# Patient Record
Sex: MALE | Race: WHITE | NOT HISPANIC OR LATINO | URBAN - METROPOLITAN AREA
[De-identification: names, ages, dates, MRNs, and addresses within clinical notes are randomized per-mention and may not be internally consistent; named-entity substitution may affect disease eponyms.]

---

## 2021-07-15 ENCOUNTER — OUTPATIENT (OUTPATIENT)
Dept: OUTPATIENT SERVICES | Facility: HOSPITAL | Age: 36
LOS: 1 days | End: 2021-07-15
Payer: COMMERCIAL

## 2021-07-15 VITALS
WEIGHT: 179.9 LBS | DIASTOLIC BLOOD PRESSURE: 82 MMHG | OXYGEN SATURATION: 100 % | HEART RATE: 72 BPM | RESPIRATION RATE: 16 BRPM | TEMPERATURE: 97 F | SYSTOLIC BLOOD PRESSURE: 132 MMHG | HEIGHT: 71 IN

## 2021-07-15 VITALS
OXYGEN SATURATION: 99 % | RESPIRATION RATE: 18 BRPM | HEART RATE: 63 BPM | DIASTOLIC BLOOD PRESSURE: 83 MMHG | SYSTOLIC BLOOD PRESSURE: 117 MMHG

## 2021-07-15 DIAGNOSIS — R10.13 EPIGASTRIC PAIN: ICD-10-CM

## 2021-07-15 DIAGNOSIS — I45.6 PRE-EXCITATION SYNDROME: ICD-10-CM

## 2021-07-15 DIAGNOSIS — K21.9 GASTRO-ESOPHAGEAL REFLUX DISEASE WITHOUT ESOPHAGITIS: ICD-10-CM

## 2021-07-15 PROCEDURE — 43239 EGD BIOPSY SINGLE/MULTIPLE: CPT

## 2021-07-15 PROCEDURE — 88305 TISSUE EXAM BY PATHOLOGIST: CPT | Mod: 26

## 2021-07-15 PROCEDURE — 88305 TISSUE EXAM BY PATHOLOGIST: CPT

## 2021-07-15 PROCEDURE — 45380 COLONOSCOPY AND BIOPSY: CPT

## 2021-07-15 RX ORDER — SODIUM CHLORIDE 9 MG/ML
500 INJECTION INTRAMUSCULAR; INTRAVENOUS; SUBCUTANEOUS
Refills: 0 | Status: COMPLETED | OUTPATIENT
Start: 2021-07-15 | End: 2021-07-15

## 2021-07-15 RX ORDER — PROCAINAMIDE HCL 500 MG
1000 TABLET, EXTENDED RELEASE ORAL ONCE
Refills: 0 | Status: DISCONTINUED | OUTPATIENT
Start: 2021-07-15 | End: 2021-07-15

## 2021-07-15 RX ADMIN — SODIUM CHLORIDE 30 MILLILITER(S): 9 INJECTION INTRAMUSCULAR; INTRAVENOUS; SUBCUTANEOUS at 08:31

## 2021-07-15 NOTE — PRE PROCEDURE NOTE - PRE PROCEDURE EVALUATION
Attending Physician:               Gina Zamora MD             Procedure:  EGD and colonoscopy    Indication for Procedure:  heartburn, epigastric pain  ________________________________________________________  PAST MEDICAL & SURGICAL HISTORY:  WPW    ALLERGIES:  PCN    HOME MEDICATIONS:  famotidine, diphenhydramine QHS PRN    AICD/PPM: [ ] yes   [X] no    PERTINENT LAB DATA:    PHYSICAL EXAMINATION:  T(C): --  HR: --  BP: --  RR: --  SpO2: --    Constitutional: NAD    Neck:  No JVD  Respiratory: CTAB/L  Cardiovascular: S1 and S2  Gastrointestinal: BS+, soft, NT/ND  Extremities: No peripheral edema  Neurological: A/O x 3, no focal deficits    COMMENTS:    The patient is a suitable candidate for the planned procedure unless box checked [ ]  No, explain:

## 2021-07-15 NOTE — ASU PATIENT PROFILE, ADULT - NS TRANSFER DISPOSITION PATIENT BELONGINGS
with patient Lip Wedge Excision Repair Text: Given the location of the defect and the proximity to free margins a full thickness wedge repair was deemed most appropriate.  Using a sterile surgical marker, the appropriate repair was drawn incorporating the defect and placing the expected incisions perpendicular to the vermilion border.  The vermilion border was also meticulously outlined to ensure appropriate reapproximation during the repair.  The area thus outlined was incised through and through with a #15 scalpel blade.  The muscularis and dermis were reaproximated with deep sutures following hemostasis. Care was taken to realign the vermilion border before proceeding with the superficial closure.  Once the vermilion was realigned the superfical and mucosal closure was finished.

## 2021-07-15 NOTE — PRE-ANESTHESIA EVALUATION ADULT - NSANTHOSAYNRD_GEN_A_CORE
No. BARBER screening performed.  STOP BANG Legend: 0-2 = LOW Risk; 3-4 = INTERMEDIATE Risk; 5-8 = HIGH Risk

## 2021-07-21 LAB — SURGICAL PATHOLOGY STUDY: SIGNIFICANT CHANGE UP

## 2024-08-22 NOTE — ASU DISCHARGE PLAN (ADULT/PEDIATRIC) - D. IF YOU HAD ANY TYPE OF SEDATION, YOU MAY EXPERIENCE LIGHTHEADEDNESS, DIZZINESS, OR SLEEPINESS FOLLOWING YOUR PROCEDURE. A RESPONSIBLE ADULT SHOULD STAY WITH YOU FOR AT LEAST 24 HOURS FOLLOWING YOUR PROCEDURE.
Occupational Therapy Visit    Visit Type: Daily Treatment Note  Visit: 5  Referring Provider: RAJAN Diaz  Medical Diagnosis (from order): M25.511, G89.29 - Chronic right shoulder pain  M77.8 - Tendonitis of elbow, right  M70.80, X50.3XXA - Repetitive motion injury     SUBJECTIVE                                                                                                               This week has been really bad. The elbow and upper trap has been bothering me so much lately. The whole arm just feels heavy.   When I drove this past weekend, I got stuck in traffic and I feel like that bothered it more.     Pain / Symptoms  - Pain rating (out of 10): Current: 5      OBJECTIVE                                                                                                                     Range of Motion (ROM)   (degrees unless noted; active unless noted; norms in ( ); negative=lacking to 0, positive=beyond 0)  Shoulder:    - Internal Rotation:         • Right: symptom reaction        - at 90° (70-90):            • Right: 55    - External Rotation:        • Right: symptom reaction       - at 90° (90):            • Right: 70                         Treatment    Dry Needling:  - Consent signed: yes  - Dry needling used to/for: pain relief, reduced myofascial dysfunction/restriction, improve range of motion and neuromuscular excitation  - Dry needling utilized with electrical stimulation to enhance effects of dry needling treatment.  - Education about indications, contraindications and potential side effects completed with patient.  Screen Completed    - Precautions: local skin lesions, lyme disease, local lymphedema, severe hyperalgesia/allodynia, metal allergies: nickel and chromium, abnormal bleeding tendency, immunodeficiency and/or compromised immune system, second or third trimester of pregnancy, vascular disease, history of spontaneous pneumothorax   - Contraindications: local or systemic infections  including the flu, over implants, active cancer, area of lymphatic compromise, area of lumpectomy/mastectomy, first trimester of pregnancy    Dry Needling completed and enhanced with electrical stimulation (E-Stim)   - Location: right infraspinatus Needle size: 50 Quantity: 1   - Location: right upper trap Needle size: 40 Quantity: 1   - Location: right biceps Needle size: 40 Quantity: 1   - Location: right triceps Needle size: 40 Quantity: 1   - Location: right pronator teres Needle size: 40 Quantity: 1   - Location: right common flexors Needle size: 40 Quantity: 1    Total Needles Inserted:6 Removed: 6    Results: decreased pain, improved range of motion, improved circulation and tissue softening  Reaction: no adverse reaction to treatment      Therapeutic Exercise  Post needling active-recovery education. Reviewed recommendations for light activity, frequent position changes and range of motion, light aerobic activity, comfortable stretching and self soft tissue mobilization.     Instructed patient on proximal involvement and neuromuscular restrictions attributed to symptoms of the right upper extremity. Performed 1st rib mobilizations and instructed upon for home exercise program.   Additionally instructed patient on open book thoracic mobilizations to promote anterior chest opening and pectoralis release. Performed x5 *added to home exercise program*     Manual Therapy   Soft tissue mobilization completed to right upper extremity, shoulder, elbow, forearm with biofreeze to reduce soft tissue restrictions, promote improved circulation, and decrease tenderness in turn for pain reduction. Active release technique performed for deeper tissue release; myofacial cupping to medial epicondyle for additional myofacial release and circulation increase for healing.     Additional soft tissue mobilization performed in preparation for dry needling for the identification of trigger points and taut bands throughout right  shoulder; infraspinatus, upper trap, biceps/triceps, pronator teres, and common flexors.    Soft tissue mobilization to scalenes/upper trap and pectoralis, followed by first rib mobilizations performed to reduce neuromuscular tension on upper extremity innervation. X6, 40 s oscillations. Instructed patient on self 1st rib mobilizations with belt for completion as part of home exercise program.     Skilled input: as detailed above    Writer verbally educated and received verbal consent for hand placement, positioning of patient, and techniques to be performed today from patient for hand placement and palpation for techniques as described above and how they are pertinent to the patient's plan of care.  Home Exercise Program  Access Code: [email or web address]  URL: [email or web address]  Date: 08/15/2024  Prepared by: Valencia Amezcua    Exercises  - Supine Static Chest Stretch on Foam Roll  - 2 x daily - 7 x weekly - 1 sets - 3 reps  - Standing Pec Stretch at Wall  - 2 x daily - 7 x weekly - 1 sets - 3 reps  - Standing Wrist Extension Stretch  - 2-3 x daily - 3 reps - 30 hold  - Shoulder External Rotation and Scapular Retraction with Resistance  - 3 x weekly - 3 sets - 10 reps  - Shoulder extension with resistance - Neutral  - 3 x weekly - 3 sets - 10 reps  - Shoulder Extension with Resistance - Palms Forward  - 3 x weekly - 3 sets - 10 reps  - Upper Trapezius Stretch  - 30 hold  - Gripping Sponge Neutral  - 3 x weekly - 10 reps  - Gripping Sponge Pronated  - 3 x weekly - 10 reps  - Gripping Sponge Supinated  - 3 x weekly - 10 reps  - Wrist Flexion with Dumbbell  - 3 x weekly - 3 sets - 10 reps      ASSESSMENT                                                                                                            Proximal restrictions noted throughout the right upper extremity, likely associated with symptoms. Addressed with modalities and manual techniques as well as further instruction on home  exercise program completion to address. Continues to benefit from skilled therapy to address limitations and maximize functional use of the right upper extremity.   Pain/symptoms after session (out of 10): 1  Education:   - Results of above outlined education: Verbalizes understanding and Demonstrates understanding    PLAN                                                                                                                           Suggestions for next session as indicated: Progress per plan of care  manual treatment, AROM, stretches, dry needling, scapular stabilization, rotator cuff strengthening, modalities as needed.  Review external rotation with tband   Forearm/wrist strengthening/ strengthening pending pain  Periscapular stability   Further address proximal limitations / TOS        Therapy procedure time and total treatment time can be found documented on the Time Entry flowsheet   Statement Selected